# Patient Record
Sex: FEMALE | Race: WHITE | NOT HISPANIC OR LATINO | Employment: OTHER | URBAN - METROPOLITAN AREA
[De-identification: names, ages, dates, MRNs, and addresses within clinical notes are randomized per-mention and may not be internally consistent; named-entity substitution may affect disease eponyms.]

---

## 2019-08-29 ENCOUNTER — HOSPITAL ENCOUNTER (INPATIENT)
Facility: HOSPITAL | Age: 44
LOS: 1 days | Discharge: HOME | DRG: 918 | End: 2019-08-30
Attending: EMERGENCY MEDICINE | Admitting: HOSPITALIST
Payer: COMMERCIAL

## 2019-08-29 DIAGNOSIS — R00.0 TACHYCARDIA: ICD-10-CM

## 2019-08-29 DIAGNOSIS — R41.82 ALTERED MENTAL STATUS: ICD-10-CM

## 2019-08-29 DIAGNOSIS — T40.1X1A HEROIN OVERDOSE, ACCIDENTAL OR UNINTENTIONAL, INITIAL ENCOUNTER: Primary | ICD-10-CM

## 2019-08-29 DIAGNOSIS — R07.9 CHEST PAIN: ICD-10-CM

## 2019-08-29 LAB
ANION GAP SERPL CALC-SCNC: 14 MMOL/L (ref 8–16)
BUN SERPL-MCNC: 23 MG/DL (ref 6–30)
CHLORIDE SERPL-SCNC: 104 MMOL/L (ref 95–110)
CREAT SERPL-MCNC: 1.1 MG/DL (ref 0.5–1.4)
GLUCOSE SERPL-MCNC: 244 MG/DL (ref 70–110)
GLUCOSE SERPL-MCNC: 253 MG/DL (ref 70–110)
HCT VFR BLD CALC: 40 %PCV (ref 36–54)
POC IONIZED CALCIUM: 1.09 MMOL/L (ref 1.06–1.42)
POC TCO2 (MEASURED): 24 MMOL/L (ref 23–29)
POTASSIUM BLD-SCNC: 5 MMOL/L (ref 3.5–5.1)
SAMPLE: ABNORMAL
SODIUM BLD-SCNC: 136 MMOL/L (ref 136–145)

## 2019-08-29 PROCEDURE — 96376 TX/PRO/DX INJ SAME DRUG ADON: CPT

## 2019-08-29 PROCEDURE — 81025 URINE PREGNANCY TEST: CPT | Performed by: STUDENT IN AN ORGANIZED HEALTH CARE EDUCATION/TRAINING PROGRAM

## 2019-08-29 PROCEDURE — 96375 TX/PRO/DX INJ NEW DRUG ADDON: CPT

## 2019-08-29 PROCEDURE — 63600175 PHARM REV CODE 636 W HCPCS: Performed by: STUDENT IN AN ORGANIZED HEALTH CARE EDUCATION/TRAINING PROGRAM

## 2019-08-29 PROCEDURE — 84484 ASSAY OF TROPONIN QUANT: CPT

## 2019-08-29 PROCEDURE — 99285 EMERGENCY DEPT VISIT HI MDM: CPT | Mod: 25

## 2019-08-29 PROCEDURE — 96374 THER/PROPH/DIAG INJ IV PUSH: CPT

## 2019-08-29 PROCEDURE — 94770 HC EXHALED C02 TEST: CPT

## 2019-08-29 PROCEDURE — 80053 COMPREHEN METABOLIC PANEL: CPT

## 2019-08-29 PROCEDURE — 83880 ASSAY OF NATRIURETIC PEPTIDE: CPT

## 2019-08-29 PROCEDURE — 99900035 HC TECH TIME PER 15 MIN (STAT)

## 2019-08-29 PROCEDURE — 85025 COMPLETE CBC W/AUTO DIFF WBC: CPT

## 2019-08-29 RX ORDER — NALOXONE HCL 0.4 MG/ML
0.4 VIAL (ML) INJECTION
Status: COMPLETED | OUTPATIENT
Start: 2019-08-29 | End: 2019-08-29

## 2019-08-29 RX ADMIN — NALOXONE HYDROCHLORIDE 0.4 MG: 0.4 INJECTION, SOLUTION INTRAMUSCULAR; INTRAVENOUS; SUBCUTANEOUS at 11:08

## 2019-08-30 VITALS
TEMPERATURE: 98 F | DIASTOLIC BLOOD PRESSURE: 57 MMHG | RESPIRATION RATE: 7 BRPM | HEART RATE: 71 BPM | WEIGHT: 146.19 LBS | SYSTOLIC BLOOD PRESSURE: 111 MMHG | BODY MASS INDEX: 25.9 KG/M2 | HEIGHT: 63 IN | OXYGEN SATURATION: 91 %

## 2019-08-30 PROBLEM — R06.89 RESPIRATORY DEPRESSION: Status: ACTIVE | Noted: 2019-08-30

## 2019-08-30 PROBLEM — F19.10 POLYSUBSTANCE ABUSE: Status: ACTIVE | Noted: 2019-08-30

## 2019-08-30 PROBLEM — T40.1X1A HEROIN OVERDOSE, ACCIDENTAL OR UNINTENTIONAL, INITIAL ENCOUNTER: Status: ACTIVE | Noted: 2019-08-30

## 2019-08-30 LAB
ALBUMIN SERPL BCP-MCNC: 4.1 G/DL (ref 3.5–5.2)
ALP SERPL-CCNC: 67 U/L (ref 55–135)
ALT SERPL W/O P-5'-P-CCNC: 67 U/L (ref 10–44)
AMPHET+METHAMPHET UR QL: NEGATIVE
ANION GAP SERPL CALC-SCNC: 9 MMOL/L (ref 8–16)
AST SERPL-CCNC: 72 U/L (ref 10–40)
B-HCG UR QL: NEGATIVE
BACTERIA #/AREA URNS HPF: ABNORMAL /HPF
BARBITURATES UR QL SCN>200 NG/ML: NORMAL
BASOPHILS # BLD AUTO: 0.07 K/UL (ref 0–0.2)
BASOPHILS NFR BLD: 0.5 % (ref 0–1.9)
BENZODIAZ UR QL SCN>200 NG/ML: NEGATIVE
BILIRUB SERPL-MCNC: 0.6 MG/DL (ref 0.1–1)
BILIRUB UR QL STRIP: NEGATIVE
BNP SERPL-MCNC: 10 PG/ML (ref 0–99)
BUN SERPL-MCNC: 23 MG/DL (ref 6–20)
BZE UR QL SCN: NORMAL
CALCIUM SERPL-MCNC: 8 MG/DL (ref 8.7–10.5)
CANNABINOIDS UR QL SCN: NEGATIVE
CHLORIDE SERPL-SCNC: 102 MMOL/L (ref 95–110)
CK SERPL-CCNC: 411 U/L (ref 20–180)
CLARITY UR: CLEAR
CO2 SERPL-SCNC: 23 MMOL/L (ref 23–29)
COLOR UR: YELLOW
CREAT SERPL-MCNC: 1.1 MG/DL (ref 0.5–1.4)
CREAT UR-MCNC: 81 MG/DL (ref 15–325)
CTP QC/QA: YES
DIFFERENTIAL METHOD: ABNORMAL
EOSINOPHIL # BLD AUTO: 0.1 K/UL (ref 0–0.5)
EOSINOPHIL NFR BLD: 0.5 % (ref 0–8)
ERYTHROCYTE [DISTWIDTH] IN BLOOD BY AUTOMATED COUNT: 14.7 % (ref 11.5–14.5)
EST. GFR  (AFRICAN AMERICAN): >60 ML/MIN/1.73 M^2
EST. GFR  (NON AFRICAN AMERICAN): >60 ML/MIN/1.73 M^2
GLUCOSE SERPL-MCNC: 251 MG/DL (ref 70–110)
GLUCOSE UR QL STRIP: ABNORMAL
HCT VFR BLD AUTO: 39.3 % (ref 37–48.5)
HGB BLD-MCNC: 12.9 G/DL (ref 12–16)
HGB UR QL STRIP: NEGATIVE
HYALINE CASTS #/AREA URNS LPF: 10 /LPF
IMM GRANULOCYTES # BLD AUTO: 0.23 K/UL (ref 0–0.04)
IMM GRANULOCYTES NFR BLD AUTO: 1.6 % (ref 0–0.5)
KETONES UR QL STRIP: NEGATIVE
LEUKOCYTE ESTERASE UR QL STRIP: NEGATIVE
LYMPHOCYTES # BLD AUTO: 2.1 K/UL (ref 1–4.8)
LYMPHOCYTES NFR BLD: 14.4 % (ref 18–48)
MCH RBC QN AUTO: 29.9 PG (ref 27–31)
MCHC RBC AUTO-ENTMCNC: 32.8 G/DL (ref 32–36)
MCV RBC AUTO: 91 FL (ref 82–98)
MICROSCOPIC COMMENT: ABNORMAL
MONOCYTES # BLD AUTO: 0.8 K/UL (ref 0.3–1)
MONOCYTES NFR BLD: 5.5 % (ref 4–15)
NEUTROPHILS # BLD AUTO: 11.3 K/UL (ref 1.8–7.7)
NEUTROPHILS NFR BLD: 77.5 % (ref 38–73)
NITRITE UR QL STRIP: POSITIVE
NRBC BLD-RTO: 0 /100 WBC
OPIATES UR QL SCN: NORMAL
PCP UR QL SCN>25 NG/ML: NEGATIVE
PH UR STRIP: 6 [PH] (ref 5–8)
PLATELET # BLD AUTO: 275 K/UL (ref 150–350)
PMV BLD AUTO: 9.9 FL (ref 9.2–12.9)
POTASSIUM SERPL-SCNC: 4.9 MMOL/L (ref 3.5–5.1)
PROT SERPL-MCNC: 7.2 G/DL (ref 6–8.4)
PROT UR QL STRIP: ABNORMAL
RBC # BLD AUTO: 4.31 M/UL (ref 4–5.4)
RBC #/AREA URNS HPF: 0 /HPF (ref 0–4)
SODIUM SERPL-SCNC: 134 MMOL/L (ref 136–145)
SP GR UR STRIP: 1.01 (ref 1–1.03)
SQUAMOUS #/AREA URNS HPF: 3 /HPF
TOXICOLOGY INFORMATION: NORMAL
TROPONIN I SERPL DL<=0.01 NG/ML-MCNC: <0.03 NG/ML (ref 0.02–0.04)
TROPONIN I SERPL DL<=0.01 NG/ML-MCNC: <0.03 NG/ML (ref 0.02–0.04)
URN SPEC COLLECT METH UR: ABNORMAL
UROBILINOGEN UR STRIP-ACNC: NEGATIVE EU/DL
WBC # BLD AUTO: 14.62 K/UL (ref 3.9–12.7)
WBC #/AREA URNS HPF: 2 /HPF (ref 0–5)
YEAST URNS QL MICRO: ABNORMAL

## 2019-08-30 PROCEDURE — 84484 ASSAY OF TROPONIN QUANT: CPT

## 2019-08-30 PROCEDURE — 20000000 HC ICU ROOM

## 2019-08-30 PROCEDURE — 81001 URINALYSIS AUTO W/SCOPE: CPT

## 2019-08-30 PROCEDURE — 93005 ELECTROCARDIOGRAM TRACING: CPT

## 2019-08-30 PROCEDURE — 82550 ASSAY OF CK (CPK): CPT

## 2019-08-30 PROCEDURE — 63600175 PHARM REV CODE 636 W HCPCS: Performed by: HOSPITALIST

## 2019-08-30 PROCEDURE — 25000003 PHARM REV CODE 250: Performed by: HOSPITALIST

## 2019-08-30 PROCEDURE — 80307 DRUG TEST PRSMV CHEM ANLYZR: CPT

## 2019-08-30 PROCEDURE — 87040 BLOOD CULTURE FOR BACTERIA: CPT

## 2019-08-30 PROCEDURE — 36415 COLL VENOUS BLD VENIPUNCTURE: CPT

## 2019-08-30 PROCEDURE — 63600175 PHARM REV CODE 636 W HCPCS: Performed by: EMERGENCY MEDICINE

## 2019-08-30 PROCEDURE — 63600175 PHARM REV CODE 636 W HCPCS: Performed by: STUDENT IN AN ORGANIZED HEALTH CARE EDUCATION/TRAINING PROGRAM

## 2019-08-30 RX ORDER — MUPIROCIN 20 MG/G
OINTMENT TOPICAL 2 TIMES DAILY
Status: DISCONTINUED | OUTPATIENT
Start: 2019-08-30 | End: 2019-08-30 | Stop reason: HOSPADM

## 2019-08-30 RX ORDER — IBUPROFEN 200 MG
24 TABLET ORAL
Status: DISCONTINUED | OUTPATIENT
Start: 2019-08-30 | End: 2019-08-30 | Stop reason: HOSPADM

## 2019-08-30 RX ORDER — GLUCAGON 1 MG
1 KIT INJECTION
Status: DISCONTINUED | OUTPATIENT
Start: 2019-08-30 | End: 2019-08-30 | Stop reason: HOSPADM

## 2019-08-30 RX ORDER — CHLORHEXIDINE GLUCONATE ORAL RINSE 1.2 MG/ML
15 SOLUTION DENTAL 2 TIMES DAILY
Status: DISCONTINUED | OUTPATIENT
Start: 2019-08-30 | End: 2019-08-30 | Stop reason: HOSPADM

## 2019-08-30 RX ORDER — NALOXONE HCL 0.4 MG/ML
0.4 VIAL (ML) INJECTION
Status: COMPLETED | OUTPATIENT
Start: 2019-08-30 | End: 2019-08-30

## 2019-08-30 RX ORDER — ENOXAPARIN SODIUM 100 MG/ML
40 INJECTION SUBCUTANEOUS EVERY 24 HOURS
Status: DISCONTINUED | OUTPATIENT
Start: 2019-08-30 | End: 2019-08-30 | Stop reason: HOSPADM

## 2019-08-30 RX ORDER — CEFTRIAXONE 1 G/1
1 INJECTION, POWDER, FOR SOLUTION INTRAMUSCULAR; INTRAVENOUS
Status: COMPLETED | OUTPATIENT
Start: 2019-08-30 | End: 2019-08-30

## 2019-08-30 RX ORDER — IBUPROFEN 200 MG
16 TABLET ORAL
Status: DISCONTINUED | OUTPATIENT
Start: 2019-08-30 | End: 2019-08-30 | Stop reason: HOSPADM

## 2019-08-30 RX ORDER — POLYETHYLENE GLYCOL 3350 17 G/17G
17 POWDER, FOR SOLUTION ORAL DAILY
Status: DISCONTINUED | OUTPATIENT
Start: 2019-08-30 | End: 2019-08-30 | Stop reason: HOSPADM

## 2019-08-30 RX ORDER — SODIUM CHLORIDE 0.9 % (FLUSH) 0.9 %
10 SYRINGE (ML) INJECTION
Status: DISCONTINUED | OUTPATIENT
Start: 2019-08-30 | End: 2019-08-30 | Stop reason: HOSPADM

## 2019-08-30 RX ORDER — IPRATROPIUM BROMIDE AND ALBUTEROL SULFATE 2.5; .5 MG/3ML; MG/3ML
3 SOLUTION RESPIRATORY (INHALATION) EVERY 8 HOURS
Status: DISCONTINUED | OUTPATIENT
Start: 2019-08-30 | End: 2019-08-30

## 2019-08-30 RX ORDER — SODIUM CHLORIDE 9 MG/ML
INJECTION, SOLUTION INTRAVENOUS CONTINUOUS
Status: DISCONTINUED | OUTPATIENT
Start: 2019-08-30 | End: 2019-08-30 | Stop reason: HOSPADM

## 2019-08-30 RX ORDER — IPRATROPIUM BROMIDE AND ALBUTEROL SULFATE 2.5; .5 MG/3ML; MG/3ML
3 SOLUTION RESPIRATORY (INHALATION) EVERY 6 HOURS PRN
Status: DISCONTINUED | OUTPATIENT
Start: 2019-08-30 | End: 2019-08-30 | Stop reason: HOSPADM

## 2019-08-30 RX ADMIN — CEFTRIAXONE SODIUM 1 G: 1 INJECTION, POWDER, FOR SOLUTION INTRAMUSCULAR; INTRAVENOUS at 03:08

## 2019-08-30 RX ADMIN — NALOXONE HYDROCHLORIDE 0.4 MG/HR: 0.4 INJECTION, SOLUTION INTRAMUSCULAR; INTRAVENOUS; SUBCUTANEOUS at 08:08

## 2019-08-30 RX ADMIN — NALOXONE HYDROCHLORIDE 0.4 MG: 0.4 INJECTION, SOLUTION INTRAMUSCULAR; INTRAVENOUS; SUBCUTANEOUS at 03:08

## 2019-08-30 RX ADMIN — NALOXONE HYDROCHLORIDE 0.4 MG: 0.4 INJECTION, SOLUTION INTRAMUSCULAR; INTRAVENOUS; SUBCUTANEOUS at 02:08

## 2019-08-30 RX ADMIN — SODIUM CHLORIDE 1000 ML: 9 INJECTION, SOLUTION INTRAVENOUS at 04:08

## 2019-08-30 RX ADMIN — NALOXONE HYDROCHLORIDE 0.4 MG/HR: 0.4 INJECTION, SOLUTION INTRAMUSCULAR; INTRAVENOUS; SUBCUTANEOUS at 04:08

## 2019-08-30 RX ADMIN — FOLIC ACID: 5 INJECTION, SOLUTION INTRAMUSCULAR; INTRAVENOUS; SUBCUTANEOUS at 07:08

## 2019-08-30 RX ADMIN — SODIUM CHLORIDE: 0.9 INJECTION, SOLUTION INTRAVENOUS at 06:08

## 2019-08-30 NOTE — ED NOTES
Patient identifiers for Adeola Cameron checked and correct.  LOC:  Adeola Cameron is drowsy and hard to arouse at this time.   SKIN:  The skin is warm and dry. She has normal skin turgor and moist mucus membranes. Skin is intact; bruising noted to the bilateral arms.  MUSCULOSKELETAL:  Pulses intact.   RESPIRATORY:  Airway is open and patent. Respirations are spontaneous but at a decreased rate.  CARDIAC:  She has a normal rate and rhythm. No peripheral edema noted. Capillary refill < 3 seconds.  ABDOMEN:  No distention noted.  Soft and non-tender upon palpation.  NEUROLOGICAL: Pupils pin point.  Allergies reported:    Review of patient's allergies indicates:   Allergen Reactions    Sulfa (sulfonamide antibiotics) Anaphylaxis     OTHER NOTES:

## 2019-08-30 NOTE — HPI
Patient is extremely drowsy most of the history was obtained from ER physician.     43-year-old lady without any significant past medical history was brought in by her uncle due to drug overdose.  Upon my assessment patient wakes up however falls back to sleep and is not giving any meaningful history except admitting that she took too much heroin.  Patient is easily arousable however she goes back to sleep and her respiratory rate goes into single digit in spite getting 5 doses of Narcan so far.  Otherwise she denies any headache, dizziness, chest pain, palpitation, nausea, vomiting, bladder or bowel symptoms.     Past medical history:  Polysubstance abuse, chronic back pain  Family history:  Unable to review  Social history:  Admits taking heroin once in a while, more than 20 pack year smoking history, denies any other recreational drug use  Surgical history:  Unable to obtain

## 2019-08-30 NOTE — ASSESSMENT & PLAN NOTE
Due to heroin overdose  Started on Narcan drip  If worsens we might have to obtain ABG  Supplemental O2

## 2019-08-30 NOTE — ASSESSMENT & PLAN NOTE
"-admits injecting "too much heroin"  -still very drowsy and becomes apneic in spite of receiving 5 doses of Narcan  -she is easily arousable  -started Narcan drip  -check CPK  -fall precautions, seizure precautions  "

## 2019-08-30 NOTE — H&P
[facility] Medicine  History & Physical    Patient Name: Adeola Cameron  MRN: [id number]  Admission Date: 2019  Attending Physician: Marizol Coleman MD   Primary Care Provider: Wayne Osborn MD         Patient information was obtained from ER records.     Subjective:     Principal Problem:Heroin overdose, accidental or unintentional, initial encounter    Chief Complaint:   Chief Complaint   Patient presents with    Drug Overdose     found by father at home        HPI: Patient is extremely drowsy most of the history was obtained from ER physician.     43-year-old lady without any significant past medical history was brought in by her uncle due to drug overdose.  Upon my assessment patient wakes up however falls back to sleep and is not giving any meaningful history except admitting that she took too much heroin.  Patient is easily arousable however she goes back to sleep and her respiratory rate goes into single digit in spite getting 5 doses of Narcan so far.  Otherwise she denies any headache, dizziness, chest pain, palpitation, nausea, vomiting, bladder or bowel symptoms.     Past medical history:  Polysubstance abuse, chronic back pain  Family history:  Unable to review  Social history:  Admits taking heroin once in a while, more than 20 pack year smoking history, denies any other recreational drug use  Surgical history:  Unable to obtain    Past Medical History:   Diagnosis Date    Bipolar affective disorder     Chronic foot pain        Past Surgical History:   Procedure Laterality Date    cervical nerve release       SECTION, CLASSIC      x2    FOOT SURGERY      FRACTURE SURGERY      TUBAL LIGATION         Review of patient's allergies indicates:   Allergen Reactions    Sulfa (sulfonamide antibiotics) Anaphylaxis       No current facility-administered medications on file prior to encounter.      Current Outpatient Medications on File Prior to Encounter    Medication Sig    cloNIDine (CATAPRES) 0.1 MG tablet Take 1 tablet (0.1 mg total) by mouth 2 (two) times daily.    gabapentin (NEURONTIN) 400 MG capsule Take 400 mg by mouth every evening.    gabapentin (NEURONTIN) 600 MG tablet Take 800 mg by mouth 3 (three) times daily.     lidocaine HCL 2% (XYLOCAINE) 2 % jelly     methocarbamol (ROBAXIN) 500 MG Tab Take 500 mg by mouth 4 (four) times daily.    naproxen (NAPROSYN) 500 MG tablet Take 1 tablet (500 mg total) by mouth 2 (two) times daily as needed.    ondansetron (ZOFRAN) 4 MG tablet Take 1 tablet (4 mg total) by mouth every 8 (eight) hours as needed for Nausea.    oxycodone-acetaminophen (PERCOCET)  mg per tablet Take 1 tablet by mouth 4 (four) times daily as needed.    tizanidine (ZANAFLEX) 4 MG tablet     trazodone (DESYREL) 50 MG tablet Take 50 mg by mouth every evening.    valacyclovir (VALTREX) 1000 MG tablet Take 1 tablet (1,000 mg total) by mouth every 12 (twelve) hours.     Family History     None        Tobacco Use    Smoking status: Current Every Day Smoker     Packs/day: 1.00     Types: Cigarettes   Substance and Sexual Activity    Alcohol use: No    Drug use: Yes     Types: Marijuana, IV     Comment: Heroin, crack    Sexual activity: Not on file     Review of Systems   Constitutional: Negative for chills and fever.   HENT: Negative for dental problem and sore throat.    Eyes: Negative for redness and itching.   Respiratory: Positive for apnea. Negative for chest tightness and shortness of breath.    Cardiovascular: Negative for chest pain, palpitations and leg swelling.   Gastrointestinal: Negative for blood in stool, constipation, diarrhea and nausea.   Genitourinary: Negative for dysuria.   Musculoskeletal: Positive for back pain and myalgias. Negative for gait problem and joint swelling.   Skin: Positive for rash.   Neurological: Positive for dizziness. Negative for tremors and weakness.        Somnolent    Psychiatric/Behavioral: Positive for confusion. Negative for behavioral problems and sleep disturbance.   All other systems reviewed and are negative.    Objective:     Vital Signs (Most Recent):  Temp: 97.9 °F (36.6 °C) (08/29/19 2338)  Pulse: 88 (08/30/19 0130)  Resp: (!) 9 (08/30/19 0100)  BP: 125/69 (08/30/19 0130)  SpO2: 95 % (08/30/19 0130) Vital Signs (24h Range):  Temp:  [97.9 °F (36.6 °C)] 97.9 °F (36.6 °C)  Pulse:  [82-88] 88  Resp:  [9-14] 9  SpO2:  [50 %-100 %] 95 %  BP: (108-125)/(65-76) 125/69     Weight: 64.7 kg (142 lb 10.2 oz)  Body mass index is 21.69 kg/m².    Physical Exam   Constitutional: She appears well-developed and well-nourished.   Very drowsy   HENT:   Head: Normocephalic and atraumatic.   Mouth/Throat: Oropharynx is clear and moist.   Eyes: Pupils are equal, round, and reactive to light. EOM are normal.   Neck: Normal range of motion. Neck supple. No JVD present.   Cardiovascular: Normal rate, regular rhythm and normal heart sounds. Exam reveals no gallop.   No murmur heard.  Pulmonary/Chest: Breath sounds normal. No respiratory distress. She has no wheezes.   Bradypnea   Abdominal: Soft. Bowel sounds are normal. She exhibits no distension. There is no rebound and no guarding.   Musculoskeletal: She exhibits no edema.   Lymphadenopathy:     She has no cervical adenopathy.   Neurological: No cranial nerve deficit.   Very drowsy however upon waking up she is oriented x3   Skin: Skin is warm and dry. Capillary refill takes less than 2 seconds. No rash noted.   IV drug use marks in both upper extremities   Psychiatric: Her behavior is normal.   Non suicidal   Nursing note and vitals reviewed.        CRANIAL NERVES     CN III, IV, VI   Pupils are equal, round, and reactive to light.  Extraocular motions are normal.        Significant Labs: All pertinent labs within the past 24 hours have been reviewed.    Significant Imaging: I have reviewed all pertinent imaging results/findings within the  "past 24 hours.    Assessment/Plan:     * Heroin overdose, accidental or unintentional, initial encounter  -admits injecting "too much heroin"  -still very drowsy and becomes apneic in spite of receiving 5 doses of Narcan  -she is easily arousable  -started Narcan drip  -check CPK  -fall precautions, seizure precautions    Polysubstance abuse  Admit to using heroin, UDS is positive for cocaine also  Aggressive electrolyte replacement, IV hydration  Blood cultures, urine cultures  Banana bag  Social work consult for substance abuse rehab resources    Respiratory depression    Due to heroin overdose  Started on Narcan drip  If worsens we might have to obtain ABG  Supplemental O2      VTE Risk Mitigation (From admission, onward)    None             Adan Arias MD  Department of Hospital Medicine   [facility]  "

## 2019-08-30 NOTE — PHYSICIAN QUERY
PT Name: Adeola Cameron  MR #: [id number]     Physician Query Form - Documentation Clarification      CDS/: Rani Batista               Contact information:[phone] or visit HIM    This form is a permanent document in the medical record.     Query Date: August 30, 2019    By submitting this query, we are merely seeking further clarification of documentation. Please utilize your independent clinical judgment when addressing the question(s) below.    The Medical record reflects the following:  Pt admitted for a herion overdose.    Supporting Clinical Findings Location in Medical Record   Patient is extremely drowsy     Patient is easily arousable however she goes back to sleep,in spite getting 5 doses of Narcan so far.     Positive for confusion             H/P                                                                            If you feel that the above findings are significant, is there an associated diagnosis that can be documented to better reflect the severity of the pt's neurological status this admission? Please include the type if applicable.    Provider Use Only      The patient was recovering from an unintentional overdose of heroin                                                                                                                           [  ] Clinically Undetermined

## 2019-08-30 NOTE — PLAN OF CARE
08/30/19 1536   Discharge Assessment   Assessment Type Discharge Planning Reassessment   Pt discharged to home with no needs. Cm agrees with discharge plans.

## 2019-08-30 NOTE — DISCHARGE SUMMARY
"[facility] Medicine  Discharge Summary      Patient Name: Adeola Cameron  MRN: [id number]  Admission Date: 8/29/2019  Hospital Length of Stay: 0 days  Discharge Date and Time:  08/30/2019 3:07 PM  Attending Physician: Henrik Bonilla MD   Discharging Provider: Henrik Bonilla MD  Primary Care Provider: Wayne Osborn MD      HPI:   Patient is extremely drowsy most of the history was obtained from ER physician.     43-year-old lady without any significant past medical history was brought in by her uncle due to drug overdose.  Upon my assessment patient wakes up however falls back to sleep and is not giving any meaningful history except admitting that she took too much heroin.  Patient is easily arousable however she goes back to sleep and her respiratory rate goes into single digit in spite getting 5 doses of Narcan so far.  Otherwise she denies any headache, dizziness, chest pain, palpitation, nausea, vomiting, bladder or bowel symptoms.     Past medical history:  Polysubstance abuse, chronic back pain  Family history:  Unable to review  Social history:  Admits taking heroin once in a while, more than 20 pack year smoking history, denies any other recreational drug use  Surgical history:  Unable to obtain    * No surgery found *      Hospital Course:   8/30 Admitted to MICU last PM after unintentional heroin overdose. Was placed on naloxone infusion, and woke. Infusion was discontinued 1100 hours this AM. The patient has been awake, alert and oriented without any issue since. She state she feels well and would like to be discharged home. Discussed inevitable poor outcome with heroin. The patient states, "I know". She states she will seek outside addiction counseling and try to get on Saboxone.   VSS  Lungs:Clear  Heart: S1S2 reg  Abdo: sft     Consults:     No new Assessment & Plan notes have been filed under this hospital service since the last note was generated.  Service: Hospital " Medicine    Final Active Diagnoses:    Diagnosis Date Noted POA    PRINCIPAL PROBLEM:  Heroin overdose, accidental or unintentional, initial encounter [T40.1X1A] 08/30/2019 Yes    Respiratory depression [R06.89] 08/30/2019 Yes    Polysubstance abuse [F19.10] 08/30/2019 Unknown      Problems Resolved During this Admission:       Discharged Condition: good    Disposition: Home or Self Care    Follow Up:    Patient Instructions:      Diet Cardiac     Activity as tolerated       Significant Diagnostic Studies: Labs:   BMP:   Recent Labs   Lab 08/29/19  2356   *   *   K 4.9      CO2 23   BUN 23*   CREATININE 1.1   CALCIUM 8.0*    and CBC   Recent Labs   Lab 08/29/19  2356   WBC 14.62*   HGB 12.9   HCT 39.3          Pending Diagnostic Studies:     Procedure Component Value Units Date/Time    EKG 12-lead [[id number]]     Order Status:  Sent Lab Status:  No result     EKG 12-lead [[id number]]     Order Status:  Sent Lab Status:  No result     Urinalysis, Reflex to Urine Culture Urine, Clean Catch [[id number]] Collected:  08/30/19 0642    Order Status:  Sent Lab Status:  In process Updated:  08/30/19 0644    Specimen:  Urine          Medications:  Reconciled Home Medications:      Medication List      CONTINUE taking these medications    cloNIDine 0.1 MG tablet  Commonly known as:  CATAPRES  Take 1 tablet (0.1 mg total) by mouth 2 (two) times daily.     * gabapentin 600 MG tablet  Commonly known as:  NEURONTIN  Take 800 mg by mouth 3 (three) times daily.     * gabapentin 400 MG capsule  Commonly known as:  NEURONTIN  Take 400 mg by mouth every evening.     traZODone 50 MG tablet  Commonly known as:  DESYREL  Take 50 mg by mouth every evening.     valACYclovir 1000 MG tablet  Commonly known as:  VALTREX  Take 1 tablet (1,000 mg total) by mouth every 12 (twelve) hours.         * This list has 2 medication(s) that are the same as other medications prescribed for you. Read the directions carefully, and  ask your doctor or other care provider to review them with you.            STOP taking these medications    lidocaine HCL 2% 2 % jelly  Commonly known as:  XYLOCAINE     methocarbamol 500 MG Tab  Commonly known as:  ROBAXIN     naproxen 500 MG tablet  Commonly known as:  NAPROSYN     ondansetron 4 MG tablet  Commonly known as:  ZOFRAN     oxyCODONE-acetaminophen  mg per tablet  Commonly known as:  PERCOCET     tiZANidine 4 MG tablet  Commonly known as:  ZANAFLEX            Indwelling Lines/Drains at time of discharge:   Lines/Drains/Airways          None          Time spent on the discharge of patient: 32 minutes  Patient was seen and examined on the date of discharge and determined to be suitable for discharge.         Henrik Bonilla MD  Department of Hospital Medicine  [facility]

## 2019-08-30 NOTE — HOSPITAL COURSE
"8/30 Admitted to MICU last PM after unintentional heroin overdose. Was placed on naloxone infusion, and woke. Infusion was discontinued 1100 hours this AM. The patient has been awake, alert and oriented without any issue since. She state she feels well and would like to be discharged home. Discussed inevitable poor outcome with heroin. The patient states, "I know". She states she will seek outside addiction counseling and try to get on Saboxone.   VSS  Lungs:Clear  Heart: S1S2 reg  Abdo: sft  "

## 2019-08-30 NOTE — ASSESSMENT & PLAN NOTE
Admit to using heroin, UDS is positive for cocaine also  Aggressive electrolyte replacement, IV hydration  Blood cultures, urine cultures  Banana bag  Social work consult for substance abuse rehab resources

## 2019-08-30 NOTE — SUBJECTIVE & OBJECTIVE
Past Medical History:   Diagnosis Date    Bipolar affective disorder     Chronic foot pain        Past Surgical History:   Procedure Laterality Date    cervical nerve release       SECTION, CLASSIC      x2    FOOT SURGERY      FRACTURE SURGERY      TUBAL LIGATION         Review of patient's allergies indicates:   Allergen Reactions    Sulfa (sulfonamide antibiotics) Anaphylaxis       No current facility-administered medications on file prior to encounter.      Current Outpatient Medications on File Prior to Encounter   Medication Sig    cloNIDine (CATAPRES) 0.1 MG tablet Take 1 tablet (0.1 mg total) by mouth 2 (two) times daily.    gabapentin (NEURONTIN) 400 MG capsule Take 400 mg by mouth every evening.    gabapentin (NEURONTIN) 600 MG tablet Take 800 mg by mouth 3 (three) times daily.     lidocaine HCL 2% (XYLOCAINE) 2 % jelly     methocarbamol (ROBAXIN) 500 MG Tab Take 500 mg by mouth 4 (four) times daily.    naproxen (NAPROSYN) 500 MG tablet Take 1 tablet (500 mg total) by mouth 2 (two) times daily as needed.    ondansetron (ZOFRAN) 4 MG tablet Take 1 tablet (4 mg total) by mouth every 8 (eight) hours as needed for Nausea.    oxycodone-acetaminophen (PERCOCET)  mg per tablet Take 1 tablet by mouth 4 (four) times daily as needed.    tizanidine (ZANAFLEX) 4 MG tablet     trazodone (DESYREL) 50 MG tablet Take 50 mg by mouth every evening.    valacyclovir (VALTREX) 1000 MG tablet Take 1 tablet (1,000 mg total) by mouth every 12 (twelve) hours.     Family History     None        Tobacco Use    Smoking status: Current Every Day Smoker     Packs/day: 1.00     Types: Cigarettes   Substance and Sexual Activity    Alcohol use: No    Drug use: Yes     Types: Marijuana, IV     Comment: Heroin, crack    Sexual activity: Not on file     Review of Systems   Constitutional: Negative for chills and fever.   HENT: Negative for dental problem and sore throat.    Eyes: Negative for redness and  itching.   Respiratory: Positive for apnea. Negative for chest tightness and shortness of breath.    Cardiovascular: Negative for chest pain, palpitations and leg swelling.   Gastrointestinal: Negative for blood in stool, constipation, diarrhea and nausea.   Genitourinary: Negative for dysuria.   Musculoskeletal: Positive for back pain and myalgias. Negative for gait problem and joint swelling.   Skin: Positive for rash.   Neurological: Positive for dizziness. Negative for tremors and weakness.        Somnolent   Psychiatric/Behavioral: Positive for confusion. Negative for behavioral problems and sleep disturbance.   All other systems reviewed and are negative.    Objective:     Vital Signs (Most Recent):  Temp: 97.9 °F (36.6 °C) (08/29/19 2338)  Pulse: 88 (08/30/19 0130)  Resp: (!) 9 (08/30/19 0100)  BP: 125/69 (08/30/19 0130)  SpO2: 95 % (08/30/19 0130) Vital Signs (24h Range):  Temp:  [97.9 °F (36.6 °C)] 97.9 °F (36.6 °C)  Pulse:  [82-88] 88  Resp:  [9-14] 9  SpO2:  [50 %-100 %] 95 %  BP: (108-125)/(65-76) 125/69     Weight: 64.7 kg (142 lb 10.2 oz)  Body mass index is 21.69 kg/m².    Physical Exam   Constitutional: She appears well-developed and well-nourished.   Very drowsy   HENT:   Head: Normocephalic and atraumatic.   Mouth/Throat: Oropharynx is clear and moist.   Eyes: Pupils are equal, round, and reactive to light. EOM are normal.   Neck: Normal range of motion. Neck supple. No JVD present.   Cardiovascular: Normal rate, regular rhythm and normal heart sounds. Exam reveals no gallop.   No murmur heard.  Pulmonary/Chest: Breath sounds normal. No respiratory distress. She has no wheezes.   Bradypnea   Abdominal: Soft. Bowel sounds are normal. She exhibits no distension. There is no rebound and no guarding.   Musculoskeletal: She exhibits no edema.   Lymphadenopathy:     She has no cervical adenopathy.   Neurological: No cranial nerve deficit.   Very drowsy however upon waking up she is oriented x3   Skin:  Skin is warm and dry. Capillary refill takes less than 2 seconds. No rash noted.   IV drug use marks in both upper extremities   Psychiatric: Her behavior is normal.   Non suicidal   Nursing note and vitals reviewed.        CRANIAL NERVES     CN III, IV, VI   Pupils are equal, round, and reactive to light.  Extraocular motions are normal.        Significant Labs: All pertinent labs within the past 24 hours have been reviewed.    Significant Imaging: I have reviewed all pertinent imaging results/findings within the past 24 hours.

## 2019-08-30 NOTE — ED PROVIDER NOTES
Encounter Date: 2019       History     Chief Complaint   Patient presents with    Drug Overdose     found by father at home     HPI     Ms. Cameron is a 43 y.o. Female with history of polysubstance abuse, bipolar disorder who presents to the ED via EMS after being found down by his uncle at home at the side of the bed. Patient was found to be apneic and unresponsive with response to 2nd dose of narcan. Patient admitting to taking too large of an amount of heroin and injected into left arm and denied attempting self harm.  Patient arrives with dried vomit over her clothes.  EMS reports that she vomited after receiving the 2nd dose of Narcan.  Patient denying any pain or complaints at this time.  No CBG with EMS but elevated >250 in ED on first arrival.       Review of patient's allergies indicates:   Allergen Reactions    Sulfa (sulfonamide antibiotics) Anaphylaxis     Past Medical History:   Diagnosis Date    Bipolar affective disorder     Chronic foot pain      Past Surgical History:   Procedure Laterality Date    cervical nerve release       SECTION, CLASSIC      x2    FOOT SURGERY      FRACTURE SURGERY      TUBAL LIGATION       History reviewed. No pertinent family history.  Social History     Tobacco Use    Smoking status: Current Every Day Smoker     Packs/day: 1.00     Types: Cigarettes   Substance Use Topics    Alcohol use: No    Drug use: Yes     Types: Marijuana, IV     Comment: Heroin, crack     Review of Systems   Unable to perform ROS: Mental status change       Physical Exam     Initial Vitals   BP Pulse Resp Temp SpO2   19 2336 19 2336 19 2336 19 2338 19 2334   119/73 85 14 97.9 °F (36.6 °C) (!) 50 %      MAP       --                Physical Exam    Nursing note and vitals reviewed.  Constitutional: She appears well-developed and well-nourished.   Middle-aged female solmnolent but arousable, protecting her airway, slurred speech   HENT:   Head:  Normocephalic and atraumatic.   Right Ear: External ear normal.   Left Ear: External ear normal.   Mouth/Throat: Oropharynx is clear and moist.   No septal hematoma, no signs of external head trauma   Eyes: Conjunctivae are normal. Pupils are equal, round, and reactive to light.   3 mm bilaterally   Neck: Normal range of motion. Neck supple.   No midline cervical spinal tenderness   Cardiovascular: Normal rate, regular rhythm, normal heart sounds and intact distal pulses.   No murmur heard.  Pulmonary/Chest: Breath sounds normal. No respiratory distress. She has no wheezes. She has no rales. She exhibits no tenderness.   Bradypneic   Abdominal: Soft. Bowel sounds are normal. She exhibits no distension. There is no tenderness. There is no rebound and no guarding.   Musculoskeletal: Normal range of motion.   Moving all extremities spontaneously, no lower extremity swelling, no calf tenderness   Neurological: She is oriented to person, place, and time. She has normal strength. No sensory deficit. GCS score is 15. GCS eye subscore is 4. GCS verbal subscore is 5. GCS motor subscore is 6.   somnolent but arousable to noxious stimuli, protecting airway, moving all extremities spontaneously, after Narcan was more alert able to answer orientation questions appropriately to year, month, football team in [place] and president   Skin: Skin is warm and dry. Capillary refill takes less than 2 seconds.   Track honorio in the left AC         ED Course   Procedures  Labs Reviewed   CBC W/ AUTO DIFFERENTIAL - Abnormal; Notable for the following components:       Result Value    WBC 14.62 (*)     RDW 14.7 (*)     Immature Granulocytes 1.6 (*)     Gran # (ANC) 11.3 (*)     Immature Grans (Abs) 0.23 (*)     Gran% 77.5 (*)     Lymph% 14.4 (*)     All other components within normal limits   COMPREHENSIVE METABOLIC PANEL - Abnormal; Notable for the following components:    Sodium 134 (*)     Glucose 251 (*)     BUN, Bld 23 (*)     Calcium  8.0 (*)     AST 72 (*)     ALT 67 (*)     All other components within normal limits   URINALYSIS - Abnormal; Notable for the following components:    Protein, UA Trace (*)     Glucose, UA 4+ (*)     Nitrite, UA Positive (*)     All other components within normal limits   URINALYSIS MICROSCOPIC - Abnormal; Notable for the following components:    Hyaline Casts, UA 10 (*)     All other components within normal limits   CK - Abnormal; Notable for the following components:     (*)     All other components within normal limits   POCT GLUCOSE - Abnormal; Notable for the following components:    POC Glucose 244 (*)     All other components within normal limits   ISTAT PROCEDURE - Abnormal; Notable for the following components:    POC Glucose 253 (*)     All other components within normal limits   CULTURE, BLOOD   CULTURE, BLOOD   TROPONIN I   B-TYPE NATRIURETIC PEPTIDE   DRUG SCREEN PANEL, URINE EMERGENCY   DRUG SCREEN PANEL, URINE EMERGENCY   POCT URINE PREGNANCY        ECG Results          EKG 12-lead (In process)  Result time 08/30/19 04:44:09    In process by Interface, Lab In [facility] (08/30/19 04:44:09)                 Narrative:    Test Reason : R41.82,    Vent. Rate : 082 BPM     Atrial Rate : 082 BPM     P-R Int : 174 ms          QRS Dur : 090 ms      QT Int : 410 ms       P-R-T Axes : 068 -06 038 degrees     QTc Int : 479 ms    Normal sinus rhythm  Normal ECG  No previous ECGs available    Referred By: [email or web address]   SELF           Confirmed By:                   In process by Interface, Lab In [facility] (08/30/19 04:27:40)                 Narrative:    Test Reason : R41.82,    Vent. Rate : 082 BPM     Atrial Rate : 082 BPM     P-R Int : 174 ms          QRS Dur : 090 ms      QT Int : 410 ms       P-R-T Axes : 068 -06 038 degrees     QTc Int : 479 ms    Normal sinus rhythm  Normal ECG  No previous ECGs available    Referred By: [email or web address]   SELF           Confirmed By:                             Imaging Results           CT Head Without Contrast (In process)                X-Ray Chest AP Portable (In process)                  Medical Decision Making:   History:   I obtained history from: someone other than patient and EMS provider.       <> Summary of History: Uncle states that patient has had history of polysubstance abuse.  Uncle was the one to find the patient lying on the ground in between of 2 pieces of furniture and states prior to him taking a shower she was talking and acting normally.  He states he observes that she was barely breathing however did have a pulse when he checked.  Uncle called 911.  Old Medical Records: I decided to obtain old medical records.  Initial Assessment:   43-year-old female with history of polysubstance abuse, bipolar disorder and chronic tobacco use who presents to the ED with EMS after being found down with agonal respiration however did have a pulse in the setting of patient endorsing recent heroin use after patient became more responsive after 2 doses of Narcan in the field with EMS.  Patient arrives with low respiratory rate, somnolent however protecting her airway and arousable to noxious stimuli.  Patient was satting well on room air, no signs of external trauma, patient is covered in dried vomit, no signs of head trauma, clear to auscultation bilaterally, RRR, soft nontender nondistended abdomen.  Differential Diagnosis:   Heroin overdose, drug intoxication, electrolyte abnormality, UTI, sepsis, intracranial hemorrhage or mass, contusion, fracture, rhabdomyolysis, dehydration, arrhythmia, ACS/MI  Clinical Tests:   Lab Tests: Ordered and Reviewed  Radiological Study: Ordered and Reviewed  ED Management:  Given the patient was found down with uncertain circumstances will order head CT, CBC, CMP, troponin, EKG, chest x-ray, UA, UPT.  Will check POCT glucose.  Patient arrived somnolent thus given an additional dose of Narcan upon arrival with improvement in her respiratory rate as well  as alertness.  Will start patient on capnography and monitor respiratory rate with low threshold to give additional dose of Narcan if starting to decline.  If patient has required multiple doses of Narcan will consider Narcan infusion.    Case discussed with Dr. Jared Marks MD  PGY-3 [facility]                   ED Course as of Aug 30 0706   Fri Aug 30, 2019   0308 WBC(!): 14.62 [MK]   0308 Will order ceftriaxone for presumed UTI.    NITRITE UA(!): Positive [MK]   0309 Opiate Scrn, Ur: Presumptive Positive [MK]   0309 Cocaine (Metab.): Presumptive Positive [MK]   0348 Patient continues to be solmnolent with decreased respiratory rate on capnography. Patient now to receive total of 5th dose of narcan thus will order narcan infusion and IVF. Spoke with hospitalist about admission to ICU in the setting of continued altered mental status.     [MK]      ED Course User Index  [MK] Ally Marks MD     Clinical Impression:       ICD-10-CM ICD-9-CM   1. Heroin overdose, accidental or unintentional, initial encounter T40.1X1A 965.01     E850.0   2. Altered mental status R41.82 780.97   3. Chest pain R07.9 786.50   4. Tachycardia R00.0 785.0                                Ally Marks MD  Resident  08/30/19 0707

## 2019-08-30 NOTE — PLAN OF CARE
08/30/19 1104   Discharge Assessment   Assessment Type Discharge Planning Assessment   Confirmed/corrected address and phone number on facesheet? Yes   Assessment information obtained from? Patient   Communicated expected length of stay with patient/caregiver no   Prior to hospitilization cognitive status: Alert/Oriented   Prior to hospitalization functional status: Independent   Current cognitive status: Alert/Oriented   Current Functional Status: Needs Assistance   Lives With other relative(s)  (Uncle Adiel Dsouza)   Able to Return to Prior Arrangements yes   Is patient able to care for self after discharge? Yes   Who are your caregiver(s) and their phone number(s)? Adiel Dsouza [phone]   Patient's perception of discharge disposition home or selfcare   Readmission Within the Last 30 Days no previous admission in last 30 days   Patient currently being followed by outpatient case management? No   Patient currently receives any other outside agency services? No   Equipment Currently Used at Home none   Do you have any problems affording any of your prescribed medications? No   Is the patient taking medications as prescribed? yes   Does the patient have transportation home? Yes   Transportation Anticipated family or friend will provide   Does the patient receive services at the [facility]? No   Discharge Plan A Home with family   DME Needed Upon Discharge  none   Patient/Family in Agreement with Plan yes   Cm to follow until discharged from hospital.

## 2019-09-04 ENCOUNTER — HOSPITAL ENCOUNTER (EMERGENCY)
Facility: HOSPITAL | Age: 44
Discharge: HOME | End: 2019-09-04
Attending: EMERGENCY MEDICINE
Payer: COMMERCIAL

## 2019-09-04 VITALS
DIASTOLIC BLOOD PRESSURE: 67 MMHG | TEMPERATURE: 98 F | HEIGHT: 63 IN | RESPIRATION RATE: 20 BRPM | WEIGHT: 145 LBS | HEART RATE: 109 BPM | BODY MASS INDEX: 25.69 KG/M2 | SYSTOLIC BLOOD PRESSURE: 110 MMHG | OXYGEN SATURATION: 99 %

## 2019-09-04 DIAGNOSIS — L73.9 FOLLICULITIS: Primary | ICD-10-CM

## 2019-09-04 LAB
BACTERIA BLD CULT: NORMAL
BACTERIA BLD CULT: NORMAL
BARBITURATES UR QL SCN: NEGATIVE NG/ML
LABORATORY COMMENT REPORT: NORMAL

## 2019-09-04 PROCEDURE — 99284 EMERGENCY DEPT VISIT MOD MDM: CPT

## 2019-09-04 PROCEDURE — 25000003 PHARM REV CODE 250: Performed by: EMERGENCY MEDICINE

## 2019-09-04 RX ORDER — CLINDAMYCIN HYDROCHLORIDE 150 MG/1
CAPSULE ORAL
Status: DISCONTINUED
Start: 2019-09-04 | End: 2019-09-04 | Stop reason: HOSPADM

## 2019-09-04 RX ORDER — CLINDAMYCIN HYDROCHLORIDE 300 MG/1
300 CAPSULE ORAL EVERY 8 HOURS
Qty: 20 CAPSULE | Refills: 0 | Status: SHIPPED | OUTPATIENT
Start: 2019-09-04 | End: 2019-09-11

## 2019-09-04 RX ORDER — CLOTRIMAZOLE AND BETAMETHASONE DIPROPIONATE 10; .64 MG/G; MG/G
CREAM TOPICAL 2 TIMES DAILY
Qty: 15 G | Refills: 1 | Status: SHIPPED | OUTPATIENT
Start: 2019-09-04 | End: 2019-09-22

## 2019-09-04 RX ORDER — CLINDAMYCIN HYDROCHLORIDE 150 MG/1
300 CAPSULE ORAL
Status: COMPLETED | OUTPATIENT
Start: 2019-09-04 | End: 2019-09-04

## 2019-09-04 RX ADMIN — CLINDAMYCIN HYDROCHLORIDE 300 MG: 150 CAPSULE ORAL at 08:09

## 2019-09-05 NOTE — ED PROVIDER NOTES
Encounter Date: 2019    SCRIBE #1 NOTE: IMignon, am scribing for, and in the presence of, Dr. Adame.       History     Chief Complaint   Patient presents with    Rash     to groin     2019  8:36 PM     The patient is a 43 y.o. female  who presents with rash. Patient reports a constantly burning and itchy rash to the inner thigh and vaginal area which started suddenly yesterday. She denies any fevers, swelling, nausea or vomiting. Pt was recently at the [facility] on 2019 for IV drug overdose. PMHx of bipolar affective disorder. SHx of fx surgery, c section, tubal ligation, foot surgery.      The history is provided by the patient and a significant other.     Review of patient's allergies indicates:   Allergen Reactions    Sulfa (sulfonamide antibiotics) Anaphylaxis     Past Medical History:   Diagnosis Date    Bipolar affective disorder     Chronic foot pain      Past Surgical History:   Procedure Laterality Date    cervical nerve release       SECTION, CLASSIC      x2    FOOT SURGERY      FRACTURE SURGERY      TUBAL LIGATION       History reviewed. No pertinent family history.  Social History     Tobacco Use    Smoking status: Current Every Day Smoker     Packs/day: 1.00     Types: Cigarettes   Substance Use Topics    Alcohol use: No    Drug use: Yes     Types: Marijuana, IV     Comment: Heroin, crack     Review of Systems   Constitutional: Negative for appetite change, chills and fever.   HENT: Negative for congestion, rhinorrhea and sore throat.    Respiratory: Negative for cough and shortness of breath.    Cardiovascular: Negative for chest pain.   Gastrointestinal: Negative for abdominal pain, diarrhea, nausea and vomiting.   Genitourinary: Negative for dysuria.   Musculoskeletal: Negative for back pain and myalgias.   Skin: Positive for rash.   Neurological: Negative for weakness and numbness.   Hematological: Does not bruise/bleed easily.       Physical Exam     Initial  Vitals [09/04/19 2030]   BP Pulse Resp Temp SpO2   110/67 109 20 98.4 °F (36.9 °C) 99 %      MAP       --         Physical Exam    Nursing note and vitals reviewed.  Constitutional: No distress.   HENT:   Head: Normocephalic and atraumatic.   Mouth/Throat: Mucous membranes are normal.   Eyes: EOM are normal. Pupils are equal, round, and reactive to light.   Neck: Normal range of motion.   Cardiovascular: Normal rate, regular rhythm, normal heart sounds and intact distal pulses. Exam reveals no gallop and no friction rub.    No murmur heard.  Pulmonary/Chest: Breath sounds normal. She has no wheezes. She has no rhonchi. She has no rales.   Musculoskeletal: Normal range of motion. She exhibits no edema.   Neurological: She is alert and oriented to person, place, and time.   Skin: Skin is dry. Rash noted.   Confluent and erythematous papular rash of vulvar and vagina.   Psychiatric: She has a normal mood and affect.         ED Course   Procedures  Labs Reviewed - No data to display       Imaging Results    None          Medical Decision Making:   ED Management:  43-year-old female IV drug user presents with a perineal and vaginal rash described as itchy and painful.  The rash is concluded erythematous papules which most likely represents folliculitis; however, due to the distribution fungal infection cannot be excluded.  She will be treated with both antibacterials (clindamycin) an antifungal is (Lotrisone).       APC / Resident Notes:   I, Dr. El Adame III, personally performed the services described in this documentation. All medical record entries made by the scribe were at my direction and in my presence.  I have reviewed the chart and agree that the record reflects my personal performance and is accurate and complete       Scribe Attestation:   Scribe #1: I performed the above scribed service and the documentation accurately describes the services I performed. I attest to the accuracy of the note.                Clinical Impression:       ICD-10-CM ICD-9-CM   1. Folliculitis L73.9 704.8         Disposition:   Disposition: Discharged  Condition: Stable                        El Adame III, MD  09/04/19 3385

## 2019-09-22 ENCOUNTER — HOSPITAL ENCOUNTER (EMERGENCY)
Facility: HOSPITAL | Age: 44
Discharge: HOME | End: 2019-09-23
Attending: EMERGENCY MEDICINE
Payer: COMMERCIAL

## 2019-09-22 VITALS
RESPIRATION RATE: 16 BRPM | DIASTOLIC BLOOD PRESSURE: 47 MMHG | HEART RATE: 76 BPM | OXYGEN SATURATION: 98 % | SYSTOLIC BLOOD PRESSURE: 92 MMHG | WEIGHT: 135 LBS | BODY MASS INDEX: 23.92 KG/M2 | TEMPERATURE: 98 F | HEIGHT: 63 IN

## 2019-09-22 DIAGNOSIS — N12 PYELONEPHRITIS: Primary | ICD-10-CM

## 2019-09-22 LAB
ALBUMIN SERPL BCP-MCNC: 2.4 G/DL (ref 3.5–5.2)
ALP SERPL-CCNC: 125 U/L (ref 55–135)
ALT SERPL W/O P-5'-P-CCNC: 19 U/L (ref 10–44)
ANION GAP SERPL CALC-SCNC: 13 MMOL/L (ref 8–16)
AST SERPL-CCNC: 18 U/L (ref 10–40)
B-HCG UR QL: NEGATIVE
BACTERIA #/AREA URNS HPF: ABNORMAL /HPF
BASOPHILS NFR BLD: 0 % (ref 0–1.9)
BILIRUB SERPL-MCNC: 0.3 MG/DL (ref 0.1–1)
BILIRUB UR QL STRIP: ABNORMAL
BUN SERPL-MCNC: 30 MG/DL (ref 6–20)
CALCIUM SERPL-MCNC: 9.4 MG/DL (ref 8.7–10.5)
CHLORIDE SERPL-SCNC: 98 MMOL/L (ref 95–110)
CLARITY UR: ABNORMAL
CO2 SERPL-SCNC: 26 MMOL/L (ref 23–29)
COLOR UR: YELLOW
CREAT SERPL-MCNC: 1.7 MG/DL (ref 0.5–1.4)
CTP QC/QA: YES
DIFFERENTIAL METHOD: ABNORMAL
EOSINOPHIL NFR BLD: 1 % (ref 0–8)
ERYTHROCYTE [DISTWIDTH] IN BLOOD BY AUTOMATED COUNT: 14.8 % (ref 11.5–14.5)
EST. GFR  (AFRICAN AMERICAN): 42 ML/MIN/1.73 M^2
EST. GFR  (NON AFRICAN AMERICAN): 36 ML/MIN/1.73 M^2
GLUCOSE SERPL-MCNC: 179 MG/DL (ref 70–110)
GLUCOSE UR QL STRIP: NEGATIVE
HCT VFR BLD AUTO: 34.1 % (ref 37–48.5)
HGB BLD-MCNC: 11.1 G/DL (ref 12–16)
HGB UR QL STRIP: ABNORMAL
HYALINE CASTS #/AREA URNS LPF: 0 /LPF
IMM GRANULOCYTES # BLD AUTO: ABNORMAL K/UL
KETONES UR QL STRIP: NEGATIVE
LEUKOCYTE ESTERASE UR QL STRIP: ABNORMAL
LIPASE SERPL-CCNC: 5 U/L (ref 4–60)
LYMPHOCYTES NFR BLD: 5 % (ref 18–48)
MAGNESIUM SERPL-MCNC: 1.7 MG/DL (ref 1.6–2.6)
MCH RBC QN AUTO: 29.7 PG (ref 27–31)
MCHC RBC AUTO-ENTMCNC: 32.6 G/DL (ref 32–36)
MCV RBC AUTO: 91 FL (ref 82–98)
MICROSCOPIC COMMENT: ABNORMAL
MONOCYTES NFR BLD: 6 % (ref 4–15)
NEUTROPHILS NFR BLD: 88 % (ref 38–73)
NITRITE UR QL STRIP: NEGATIVE
NRBC BLD-RTO: 0 /100 WBC
PH UR STRIP: 6 [PH] (ref 5–8)
PLATELET # BLD AUTO: 260 K/UL (ref 150–350)
PLATELET BLD QL SMEAR: ABNORMAL
PMV BLD AUTO: 9.8 FL (ref 9.2–12.9)
POTASSIUM SERPL-SCNC: 3.5 MMOL/L (ref 3.5–5.1)
PROT SERPL-MCNC: 6 G/DL (ref 6–8.4)
PROT UR QL STRIP: ABNORMAL
RBC # BLD AUTO: 3.74 M/UL (ref 4–5.4)
RBC #/AREA URNS HPF: 5 /HPF (ref 0–4)
SODIUM SERPL-SCNC: 137 MMOL/L (ref 136–145)
SP GR UR STRIP: 1.02 (ref 1–1.03)
SQUAMOUS #/AREA URNS HPF: 3 /HPF
URN SPEC COLLECT METH UR: ABNORMAL
UROBILINOGEN UR STRIP-ACNC: ABNORMAL EU/DL
WBC # BLD AUTO: 21.21 K/UL (ref 3.9–12.7)
WBC #/AREA URNS HPF: 70 /HPF (ref 0–5)
WBC CLUMPS URNS QL MICRO: ABNORMAL

## 2019-09-22 PROCEDURE — 63600175 PHARM REV CODE 636 W HCPCS: Performed by: EMERGENCY MEDICINE

## 2019-09-22 PROCEDURE — 83735 ASSAY OF MAGNESIUM: CPT

## 2019-09-22 PROCEDURE — 80053 COMPREHEN METABOLIC PANEL: CPT

## 2019-09-22 PROCEDURE — 96365 THER/PROPH/DIAG IV INF INIT: CPT

## 2019-09-22 PROCEDURE — 83690 ASSAY OF LIPASE: CPT

## 2019-09-22 PROCEDURE — 99284 EMERGENCY DEPT VISIT MOD MDM: CPT | Mod: 25

## 2019-09-22 PROCEDURE — 87186 SC STD MICRODIL/AGAR DIL: CPT

## 2019-09-22 PROCEDURE — 96361 HYDRATE IV INFUSION ADD-ON: CPT

## 2019-09-22 PROCEDURE — 85027 COMPLETE CBC AUTOMATED: CPT

## 2019-09-22 PROCEDURE — 96375 TX/PRO/DX INJ NEW DRUG ADDON: CPT

## 2019-09-22 PROCEDURE — 87088 URINE BACTERIA CULTURE: CPT

## 2019-09-22 PROCEDURE — 81025 URINE PREGNANCY TEST: CPT | Performed by: EMERGENCY MEDICINE

## 2019-09-22 PROCEDURE — 87086 URINE CULTURE/COLONY COUNT: CPT

## 2019-09-22 PROCEDURE — 36415 COLL VENOUS BLD VENIPUNCTURE: CPT

## 2019-09-22 PROCEDURE — 81000 URINALYSIS NONAUTO W/SCOPE: CPT

## 2019-09-22 PROCEDURE — 87077 CULTURE AEROBIC IDENTIFY: CPT

## 2019-09-22 PROCEDURE — 85007 BL SMEAR W/DIFF WBC COUNT: CPT

## 2019-09-22 RX ORDER — KETOROLAC TROMETHAMINE 30 MG/ML
10 INJECTION, SOLUTION INTRAMUSCULAR; INTRAVENOUS
Status: COMPLETED | OUTPATIENT
Start: 2019-09-22 | End: 2019-09-22

## 2019-09-22 RX ORDER — DIPHENHYDRAMINE HYDROCHLORIDE 50 MG/ML
25 INJECTION INTRAMUSCULAR; INTRAVENOUS
Status: COMPLETED | OUTPATIENT
Start: 2019-09-22 | End: 2019-09-22

## 2019-09-22 RX ORDER — PROCHLORPERAZINE EDISYLATE 5 MG/ML
10 INJECTION INTRAMUSCULAR; INTRAVENOUS
Status: COMPLETED | OUTPATIENT
Start: 2019-09-22 | End: 2019-09-22

## 2019-09-22 RX ORDER — TIZANIDINE HYDROCHLORIDE 4 MG/1
4 CAPSULE, GELATIN COATED ORAL 4 TIMES DAILY PRN
COMMUNITY

## 2019-09-22 RX ORDER — DICLOFENAC SODIUM 25 MG/1
25 TABLET, DELAYED RELEASE ORAL 2 TIMES DAILY PRN
Qty: 20 TABLET | Refills: 0 | Status: SHIPPED | OUTPATIENT
Start: 2019-09-22

## 2019-09-22 RX ORDER — AMOXICILLIN AND CLAVULANATE POTASSIUM 875; 125 MG/1; MG/1
1 TABLET, FILM COATED ORAL 2 TIMES DAILY
Qty: 20 TABLET | Refills: 0 | Status: SHIPPED | OUTPATIENT
Start: 2019-09-22 | End: 2019-10-02

## 2019-09-22 RX ADMIN — DIPHENHYDRAMINE HYDROCHLORIDE 25 MG: 50 INJECTION INTRAMUSCULAR; INTRAVENOUS at 10:09

## 2019-09-22 RX ADMIN — CEFTRIAXONE 1 G: 1 INJECTION, SOLUTION INTRAVENOUS at 11:09

## 2019-09-22 RX ADMIN — KETOROLAC TROMETHAMINE 10 MG: 30 INJECTION, SOLUTION INTRAMUSCULAR at 10:09

## 2019-09-22 RX ADMIN — PROCHLORPERAZINE EDISYLATE 10 MG: 5 INJECTION INTRAMUSCULAR; INTRAVENOUS at 10:09

## 2019-09-22 RX ADMIN — SODIUM CHLORIDE 1000 ML: 0.9 INJECTION, SOLUTION INTRAVENOUS at 10:09

## 2019-09-22 RX ADMIN — SODIUM CHLORIDE, SODIUM LACTATE, POTASSIUM CHLORIDE, AND CALCIUM CHLORIDE 1000 ML: .6; .31; .03; .02 INJECTION, SOLUTION INTRAVENOUS at 11:09

## 2019-09-23 NOTE — ED PROVIDER NOTES
Encounter Date: 2019    SCRIBE #1 NOTE: ILiset, am scribing for, and in the presence of, Robert Benson MD.       History     Chief Complaint   Patient presents with    Headache     X 1 week with associated ABD pain and back pain       Time seen by provider: 9:54 PM on 2019    Adeola Cameron is a 43 y.o. female with who presents to the ED with an onset of a headache, abdominal and back pain for one week. She also states that she is lightheaded, coughing, and has discomfort when defecating. She has been taking Excedrin for her headaches with mild relief. The patient states that she does not normally experience headaches often. The patient denies blood in stool, blood in urine, painful urination, fever, or any other symptoms at this time. PMHx includes bi-polar and chronic foot pain. No pertinent PSHx. Patient has a high severity drug allergy to sulfa.      The history is provided by the patient.     Review of patient's allergies indicates:   Allergen Reactions    Sulfa (sulfonamide antibiotics) Anaphylaxis     Past Medical History:   Diagnosis Date    Bipolar affective disorder     Chronic foot pain      Past Surgical History:   Procedure Laterality Date    cervical nerve release       SECTION, CLASSIC      x2    FOOT SURGERY      FRACTURE SURGERY      TUBAL LIGATION       History reviewed. No pertinent family history.  Social History     Tobacco Use    Smoking status: Current Every Day Smoker     Packs/day: 1.00     Types: Cigarettes    Smokeless tobacco: Never Used   Substance Use Topics    Alcohol use: No    Drug use: Yes     Types: Marijuana, IV     Comment: Heroin, crack     Review of Systems   Constitutional: Negative for fever.   HENT: Negative for sore throat.    Respiratory: Positive for cough. Negative for shortness of breath.    Cardiovascular: Negative for chest pain.   Gastrointestinal: Positive for abdominal pain. Negative for blood in stool and nausea.         Positive for discomfort when defecating.   Genitourinary: Negative for dysuria and hematuria.   Musculoskeletal: Negative for back pain.   Skin: Negative for rash.   Neurological: Positive for light-headedness and headaches. Negative for weakness.   Hematological: Does not bruise/bleed easily.       Physical Exam     Initial Vitals [09/22/19 2127]   BP Pulse Resp Temp SpO2   (!) 92/47 76 16 97.6 °F (36.4 °C) 98 %      MAP       --         Physical Exam    Nursing note and vitals reviewed.  Constitutional: She appears well-developed and well-nourished. She is not diaphoretic. No distress.   HENT:   Head: Normocephalic and atraumatic.   Eyes: EOM are normal. Pupils are equal, round, and reactive to light.   Neck: Normal range of motion. Neck supple.   Cardiovascular: Normal rate, regular rhythm, normal heart sounds and intact distal pulses. Exam reveals no gallop and no friction rub.    No murmur heard.  Pulmonary/Chest: Breath sounds normal. No respiratory distress. She has no wheezes. She has no rhonchi. She has no rales.   Abdominal: Soft. Bowel sounds are normal. There is no tenderness. There is no rebound and no guarding.   Musculoskeletal: Normal range of motion.   Neurological: She is alert and oriented to person, place, and time.   Photophobia.   Skin: Skin is warm and dry.   Psychiatric: She has a normal mood and affect. Her behavior is normal. Judgment and thought content normal.         ED Course   Procedures  Labs Reviewed   COMPREHENSIVE METABOLIC PANEL - Abnormal; Notable for the following components:       Result Value    Glucose 179 (*)     BUN, Bld 30 (*)     Creatinine 1.7 (*)     Albumin 2.4 (*)     eGFR if  42 (*)     eGFR if non  36 (*)     All other components within normal limits   CBC W/ AUTO DIFFERENTIAL - Abnormal; Notable for the following components:    WBC 21.21 (*)     RBC 3.74 (*)     Hemoglobin 11.1 (*)     Hematocrit 34.1 (*)     RDW 14.8 (*)     Gran%  88.0 (*)     Lymph% 5.0 (*)     All other components within normal limits   URINALYSIS, REFLEX TO URINE CULTURE - Abnormal; Notable for the following components:    Appearance, UA Cloudy (*)     Protein, UA 3+ (*)     Bilirubin (UA) 1+ (*)     Occult Blood UA 3+ (*)     Urobilinogen, UA 2.0-3.0 (*)     Leukocytes, UA 3+ (*)     All other components within normal limits    Narrative:     Preferred Collection Type->Urine, Clean Catch   URINALYSIS MICROSCOPIC - Abnormal; Notable for the following components:    RBC, UA 5 (*)     WBC, UA 70 (*)     WBC Clumps, UA Moderate (*)     Bacteria Few (*)     All other components within normal limits    Narrative:     Preferred Collection Type->Urine, Clean Catch   CULTURE, URINE   MAGNESIUM   LIPASE   POCT URINE PREGNANCY          Imaging Results    None          Medical Decision Making:   History:   Old Medical Records: I decided to obtain old medical records.  Initial Assessment:   43-year-old female presented with multiple complaints.  Differential Diagnosis:   My differential diagnosis includes migraine headache, tension headache, and viral illness.    Clinical Tests:   Lab Tests: Ordered and Reviewed  ED Management:  The patient was emergently evaluated in the emergency department, her evaluation was significant for middle-age female with a normal neurologic exam as well as a benign abdominal exam.  The patient's labs were significant for an elevated white blood cell, dehydration, infected urine.  The patient likely has dehydration and pyelonephritis.  The patient was aggressively treated with multiple IV fluid boluses, IV Rocephin, IV Compazine, IV Benadryl, and IV Toradol.  The patient reports significant improvement in her symptoms after treatment.  The patient is stable for discharge to home.  She will be discharged home with p.o. diclofenac and p.o. Augmentin.  She is to otherwise follow up with her PCP for further care.            Scribe Attestation:   Scribe #1: I  performed the above scribed service and the documentation accurately describes the services I performed. I attest to the accuracy of the note.        I, Dr. Robert Benson, personally performed the services described in this documentation. All medical record entries made by the scribe were at my direction and in my presence.  I have reviewed the chart and agree that the record reflects my personal performance and is accurate and complete. Robert Benson MD.  1:19 AM 09/23/2019            Clinical Impression:       ICD-10-CM ICD-9-CM   1. Pyelonephritis N12 590.80         Disposition:   Disposition: Discharged  Condition: Stable                        Robert Benson MD  09/23/19 0121

## 2019-09-25 LAB — BACTERIA UR CULT: ABNORMAL

## 2020-03-13 ENCOUNTER — HOSPITAL ENCOUNTER (EMERGENCY)
Facility: HOSPITAL | Age: 45
Discharge: HOME | End: 2020-03-13
Attending: EMERGENCY MEDICINE
Payer: COMMERCIAL

## 2020-03-13 VITALS
OXYGEN SATURATION: 96 % | TEMPERATURE: 98 F | BODY MASS INDEX: 23.91 KG/M2 | HEART RATE: 70 BPM | DIASTOLIC BLOOD PRESSURE: 56 MMHG | WEIGHT: 135 LBS | SYSTOLIC BLOOD PRESSURE: 106 MMHG | RESPIRATION RATE: 16 BRPM

## 2020-03-13 DIAGNOSIS — R51.9 ACUTE NONINTRACTABLE HEADACHE, UNSPECIFIED HEADACHE TYPE: Primary | ICD-10-CM

## 2020-03-13 LAB
B-HCG UR QL: NEGATIVE
CTP QC/QA: YES

## 2020-03-13 PROCEDURE — 96361 HYDRATE IV INFUSION ADD-ON: CPT

## 2020-03-13 PROCEDURE — 63600175 PHARM REV CODE 636 W HCPCS: Performed by: EMERGENCY MEDICINE

## 2020-03-13 PROCEDURE — 81025 URINE PREGNANCY TEST: CPT | Performed by: EMERGENCY MEDICINE

## 2020-03-13 PROCEDURE — 96374 THER/PROPH/DIAG INJ IV PUSH: CPT

## 2020-03-13 PROCEDURE — 96375 TX/PRO/DX INJ NEW DRUG ADDON: CPT

## 2020-03-13 PROCEDURE — 99284 EMERGENCY DEPT VISIT MOD MDM: CPT | Mod: 25

## 2020-03-13 RX ORDER — DIPHENHYDRAMINE HYDROCHLORIDE 50 MG/ML
25 INJECTION INTRAMUSCULAR; INTRAVENOUS
Status: COMPLETED | OUTPATIENT
Start: 2020-03-13 | End: 2020-03-13

## 2020-03-13 RX ORDER — METOCLOPRAMIDE HYDROCHLORIDE 5 MG/ML
10 INJECTION INTRAMUSCULAR; INTRAVENOUS
Status: COMPLETED | OUTPATIENT
Start: 2020-03-13 | End: 2020-03-13

## 2020-03-13 RX ORDER — KETOROLAC TROMETHAMINE 30 MG/ML
10 INJECTION, SOLUTION INTRAMUSCULAR; INTRAVENOUS
Status: COMPLETED | OUTPATIENT
Start: 2020-03-13 | End: 2020-03-13

## 2020-03-13 RX ADMIN — SODIUM CHLORIDE 1000 ML: 0.9 INJECTION, SOLUTION INTRAVENOUS at 07:03

## 2020-03-13 RX ADMIN — DIPHENHYDRAMINE HYDROCHLORIDE 25 MG: 50 INJECTION INTRAMUSCULAR; INTRAVENOUS at 08:03

## 2020-03-13 RX ADMIN — KETOROLAC TROMETHAMINE 10 MG: 30 INJECTION, SOLUTION INTRAMUSCULAR; INTRAVENOUS at 08:03

## 2020-03-13 RX ADMIN — METOCLOPRAMIDE 10 MG: 5 INJECTION, SOLUTION INTRAMUSCULAR; INTRAVENOUS at 08:03

## 2020-03-14 NOTE — DISCHARGE INSTRUCTIONS
As we discussed, return to the emergency department for new or worsening symptoms such as numbness, weakness, fever.

## 2020-03-14 NOTE — ED NOTES
Pt in room 7 for evaluation of headache.  Pt is awake, alert and oriented. Resp even and unlabored. Bimal breath sounds clear.  Pt denies chest pain or sob.  Abd soft and non-tender.  Pt has frontal headache with light sensitivity.

## 2020-03-14 NOTE — ED PROVIDER NOTES
Encounter Date: 3/13/2020    SCRIBE #1 NOTE: ICindi, am scribing for, and in the presence of, Enrike Mancini MD.       History     Chief Complaint   Patient presents with    Headache     took Tylenol at 10am with no relief    Emesis       Time seen by provider: 7:17 PM on 2020    Adeola Cameron is a 44 y.o. female with a PMHx of bipolar disease, heroin abuse with overdose, and opioid dependence who presents the ED with complaints of a throbbing frontal headache since last night. The patient reports the onset of headache as well as nausea last night while painting. The patient tried taking tylenol this morning without relief. She also complains of vomiting, low grade subjective fever, and neck pain. The patient denies blood in her vomit, abdominal pain, rhinorrhea, cough, diarrhea, blood in stool, SOB, LOC, changes in vision, or any other symptoms at this time. She also denies recent injury, sick contact, changes in medication, stroke, alcohol use, drug use, and OTC medication use.    The history is provided by the patient.     Review of patient's allergies indicates:   Allergen Reactions    Sulfa (sulfonamide antibiotics) Anaphylaxis     Past Medical History:   Diagnosis Date    Bipolar affective disorder     Chronic foot pain      Past Surgical History:   Procedure Laterality Date    cervical nerve release       SECTION, CLASSIC      x2    FOOT SURGERY      FRACTURE SURGERY      TUBAL LIGATION       History reviewed. No pertinent family history.  Social History     Tobacco Use    Smoking status: Current Every Day Smoker     Packs/day: 1.00     Types: Cigarettes    Smokeless tobacco: Never Used   Substance Use Topics    Alcohol use: No    Drug use: Yes     Types: Marijuana, IV     Comment: Heroin, crack     Review of Systems   Constitutional: Positive for fever.   HENT: Negative for congestion and rhinorrhea.    Eyes: Negative for visual disturbance.   Respiratory: Negative  for cough and shortness of breath.    Cardiovascular: Negative for chest pain.   Gastrointestinal: Positive for nausea and vomiting. Negative for abdominal pain, blood in stool and diarrhea.   Genitourinary: Negative for difficulty urinating.   Musculoskeletal: Positive for neck pain.   Neurological: Positive for headaches. Negative for syncope.   Hematological: Does not bruise/bleed easily.       Physical Exam     Initial Vitals [03/13/20 1730]   BP Pulse Resp Temp SpO2   118/75 83 16 98.2 °F (36.8 °C) 97 %      MAP       --         Physical Exam    Nursing note and vitals reviewed.  Constitutional: She appears well-developed and well-nourished. She is not diaphoretic. No distress.   HENT:   Head: Normocephalic and atraumatic.   Mouth/Throat: Oropharynx is clear and moist.   Mild frontal head tenderness.   Eyes: Conjunctivae are normal. Pupils are equal, round, and reactive to light.   Mild photophobia. PERRL.   Neck: Neck supple.   Neck is supple. No rigidity noted.   Cardiovascular: Normal rate, regular rhythm, normal heart sounds and intact distal pulses. Exam reveals no gallop and no friction rub.    No murmur heard.  Pulmonary/Chest: Breath sounds normal. She has no wheezes. She has no rhonchi. She has no rales.   Abdominal: Soft. She exhibits no distension. There is no tenderness.   Musculoskeletal: Normal range of motion.   Neurological: She is alert and oriented to person, place, and time.   No focal neurological deficits noted.  Cranial nerves III-XII grossly intact. 5/5 strength and sensation to light touch intact.   Skin: No rash noted. No erythema.   Psychiatric: Her speech is normal.         ED Course   Procedures  Labs Reviewed   POCT URINE PREGNANCY          Imaging Results    None          Medical Decision Making:   History:   Old Medical Records: I decided to obtain old medical records.  Clinical Tests:   Lab Tests: Reviewed and Ordered            Scribe Attestation:   Scribe #1: I performed the  above scribed service and the documentation accurately describes the services I performed. I attest to the accuracy of the note.    I, Dr. Enrike Mancini, personally performed the services described in this documentation. All medical record entries made by the scribe were at my direction and in my presence.  I have reviewed the chart and agree that the record reflects my personal performance and is accurate and complete. Enrike Mancini MD.  10:40 PM 03/13/2020    Adeola Cameron is a 44 y.o. female presenting with headache.  Patient and significant other correlate this type of headache with stress in the past and the patient does note she is under stress lately.  She has significant relief with medications here.  I have very low suspicion for alternative causes of headache such as intracranial hemorrhage, ischemic process, meningitis, idiopathic intracranial hypertension, or cavernous venous sinus thrombosis.  The patient has a non-focal neurological examination with history not consistent with emergent causes of headache warranting present therapeutic intervention.  I do not think further brain imaging or lumbar puncture are indicated at this time.  Close outpatient follow-up for reassessment recommended with detailed return precautions reviewed.        ED Course as of Mar 13 2240   Fri Mar 13, 2020   2112 Patient resting comfortably with relief of headache and no complaints.  She wishes to go home.    [MR]      ED Course User Index  [MR] Enrike Mancini MD                Clinical Impression:       ICD-10-CM ICD-9-CM   1. Acute nonintractable headache, unspecified headache type R51 784.0         Disposition:   Disposition: Discharged  Condition: Stable     ED Disposition Condition    Discharge Stable        ED Prescriptions     None        Follow-up Information     Follow up With Specialties Details Why Contact Info    Wayne Osborn MD Internal Medicine  Next week [address] [place] [address]  [place] [address]  [phone]                                       Enrike Mancini MD  03/13/20 0040

## 2021-10-20 ENCOUNTER — HOSPITAL ENCOUNTER (EMERGENCY)
Facility: HOSPITAL | Age: 46
Discharge: LEFT BEFORE DISCHARGE | End: 2021-10-20
Attending: EMERGENCY MEDICINE
Payer: MEDICAID

## 2021-10-20 VITALS
WEIGHT: 135 LBS | SYSTOLIC BLOOD PRESSURE: 150 MMHG | RESPIRATION RATE: 16 BRPM | BODY MASS INDEX: 23.91 KG/M2 | OXYGEN SATURATION: 100 % | HEART RATE: 90 BPM | DIASTOLIC BLOOD PRESSURE: 83 MMHG | TEMPERATURE: 98 F

## 2021-10-20 DIAGNOSIS — T40.604A OPIATE OVERDOSE, UNDETERMINED INTENT, INITIAL ENCOUNTER: Primary | ICD-10-CM

## 2021-10-20 DIAGNOSIS — T40.601A OPIATE OVERDOSE: ICD-10-CM

## 2021-10-20 PROCEDURE — 99283 EMERGENCY DEPT VISIT LOW MDM: CPT

## 2022-10-14 ENCOUNTER — HOSPITAL ENCOUNTER (EMERGENCY)
Facility: HOSPITAL | Age: 47
Discharge: HOME | End: 2022-10-14
Attending: EMERGENCY MEDICINE
Payer: COMMERCIAL

## 2022-10-14 VITALS
TEMPERATURE: 99 F | BODY MASS INDEX: 19.14 KG/M2 | SYSTOLIC BLOOD PRESSURE: 111 MMHG | HEIGHT: 63 IN | OXYGEN SATURATION: 99 % | DIASTOLIC BLOOD PRESSURE: 68 MMHG | HEART RATE: 72 BPM | WEIGHT: 108 LBS | RESPIRATION RATE: 17 BRPM

## 2022-10-14 DIAGNOSIS — F14.10 COCAINE ABUSE: Primary | ICD-10-CM

## 2022-10-14 LAB
ALBUMIN SERPL BCP-MCNC: 3.3 G/DL (ref 3.5–5.2)
ALP SERPL-CCNC: 48 U/L (ref 55–135)
ALT SERPL W/O P-5'-P-CCNC: 25 U/L (ref 10–44)
AMPHET+METHAMPHET UR QL: NEGATIVE
ANION GAP SERPL CALC-SCNC: 8 MMOL/L (ref 8–16)
APAP SERPL-MCNC: <3 UG/ML (ref 10–20)
AST SERPL-CCNC: 30 U/L (ref 10–40)
B-HCG UR QL: NEGATIVE
BACTERIA #/AREA URNS AUTO: ABNORMAL /HPF
BARBITURATES UR QL SCN>200 NG/ML: NEGATIVE
BASOPHILS # BLD AUTO: 0.04 K/UL (ref 0–0.2)
BASOPHILS NFR BLD: 0.6 % (ref 0–1.9)
BENZODIAZ UR QL SCN>200 NG/ML: ABNORMAL
BILIRUB SERPL-MCNC: 0.2 MG/DL (ref 0.1–1)
BILIRUB UR QL STRIP: NEGATIVE
BUN SERPL-MCNC: 17 MG/DL (ref 6–20)
BZE UR QL SCN: ABNORMAL
CALCIUM SERPL-MCNC: 9 MG/DL (ref 8.7–10.5)
CANNABINOIDS UR QL SCN: NEGATIVE
CHLORIDE SERPL-SCNC: 106 MMOL/L (ref 95–110)
CLARITY UR REFRACT.AUTO: ABNORMAL
CO2 SERPL-SCNC: 26 MMOL/L (ref 23–29)
COLOR UR AUTO: YELLOW
CREAT SERPL-MCNC: 0.7 MG/DL (ref 0.5–1.4)
CREAT UR-MCNC: 127 MG/DL (ref 15–325)
CTP QC/QA: YES
DIFFERENTIAL METHOD: ABNORMAL
EOSINOPHIL # BLD AUTO: 0.1 K/UL (ref 0–0.5)
EOSINOPHIL NFR BLD: 1 % (ref 0–8)
ERYTHROCYTE [DISTWIDTH] IN BLOOD BY AUTOMATED COUNT: 13.3 % (ref 11.5–14.5)
EST. GFR  (NO RACE VARIABLE): >60 ML/MIN/1.73 M^2
ETHANOL SERPL-MCNC: <10 MG/DL
GLUCOSE SERPL-MCNC: 89 MG/DL (ref 70–110)
GLUCOSE UR QL STRIP: NEGATIVE
HCT VFR BLD AUTO: 40.2 % (ref 37–48.5)
HGB BLD-MCNC: 13.1 G/DL (ref 12–16)
HGB UR QL STRIP: NEGATIVE
IMM GRANULOCYTES # BLD AUTO: 0.01 K/UL (ref 0–0.04)
IMM GRANULOCYTES NFR BLD AUTO: 0.2 % (ref 0–0.5)
KETONES UR QL STRIP: ABNORMAL
LEUKOCYTE ESTERASE UR QL STRIP: ABNORMAL
LYMPHOCYTES # BLD AUTO: 3.6 K/UL (ref 1–4.8)
LYMPHOCYTES NFR BLD: 57.5 % (ref 18–48)
MCH RBC QN AUTO: 30.6 PG (ref 27–31)
MCHC RBC AUTO-ENTMCNC: 32.6 G/DL (ref 32–36)
MCV RBC AUTO: 94 FL (ref 82–98)
METHADONE UR QL SCN>300 NG/ML: NEGATIVE
MICROSCOPIC COMMENT: ABNORMAL
MONOCYTES # BLD AUTO: 0.4 K/UL (ref 0.3–1)
MONOCYTES NFR BLD: 5.6 % (ref 4–15)
NEUTROPHILS # BLD AUTO: 2.2 K/UL (ref 1.8–7.7)
NEUTROPHILS NFR BLD: 35.1 % (ref 38–73)
NITRITE UR QL STRIP: NEGATIVE
NRBC BLD-RTO: 0 /100 WBC
OPIATES UR QL SCN: NEGATIVE
PCP UR QL SCN>25 NG/ML: NEGATIVE
PH UR STRIP: 7 [PH] (ref 5–8)
PLATELET # BLD AUTO: 203 K/UL (ref 150–450)
PMV BLD AUTO: 9.8 FL (ref 9.2–12.9)
POTASSIUM SERPL-SCNC: 4.6 MMOL/L (ref 3.5–5.1)
PROT SERPL-MCNC: 6.3 G/DL (ref 6–8.4)
PROT UR QL STRIP: ABNORMAL
RBC # BLD AUTO: 4.28 M/UL (ref 4–5.4)
RBC #/AREA URNS AUTO: 2 /HPF (ref 0–4)
SARS-COV-2 RDRP RESP QL NAA+PROBE: NEGATIVE
SODIUM SERPL-SCNC: 140 MMOL/L (ref 136–145)
SP GR UR STRIP: 1.02 (ref 1–1.03)
SQUAMOUS #/AREA URNS AUTO: 18 /HPF
TOXICOLOGY INFORMATION: ABNORMAL
TSH SERPL DL<=0.005 MIU/L-ACNC: 0.54 UIU/ML (ref 0.4–4)
URN SPEC COLLECT METH UR: ABNORMAL
WBC # BLD AUTO: 6.26 K/UL (ref 3.9–12.7)
WBC #/AREA URNS AUTO: 5 /HPF (ref 0–5)

## 2022-10-14 PROCEDURE — 80307 DRUG TEST PRSMV CHEM ANLYZR: CPT | Performed by: EMERGENCY MEDICINE

## 2022-10-14 PROCEDURE — 84443 ASSAY THYROID STIM HORMONE: CPT | Performed by: EMERGENCY MEDICINE

## 2022-10-14 PROCEDURE — U0002 COVID-19 LAB TEST NON-CDC: HCPCS | Performed by: EMERGENCY MEDICINE

## 2022-10-14 PROCEDURE — 80053 COMPREHEN METABOLIC PANEL: CPT | Performed by: EMERGENCY MEDICINE

## 2022-10-14 PROCEDURE — 99283 EMERGENCY DEPT VISIT LOW MDM: CPT

## 2022-10-14 PROCEDURE — 82077 ASSAY SPEC XCP UR&BREATH IA: CPT | Performed by: EMERGENCY MEDICINE

## 2022-10-14 PROCEDURE — 85025 COMPLETE CBC W/AUTO DIFF WBC: CPT | Performed by: EMERGENCY MEDICINE

## 2022-10-14 PROCEDURE — 99284 EMERGENCY DEPT VISIT MOD MDM: CPT | Mod: CS,,, | Performed by: EMERGENCY MEDICINE

## 2022-10-14 PROCEDURE — 81001 URINALYSIS AUTO W/SCOPE: CPT | Performed by: EMERGENCY MEDICINE

## 2022-10-14 PROCEDURE — 80143 DRUG ASSAY ACETAMINOPHEN: CPT | Performed by: EMERGENCY MEDICINE

## 2022-10-14 PROCEDURE — 99284 PR EMERGENCY DEPT VISIT,LEVEL IV: ICD-10-PCS | Mod: CS,,, | Performed by: EMERGENCY MEDICINE

## 2022-10-14 PROCEDURE — 81025 URINE PREGNANCY TEST: CPT | Performed by: EMERGENCY MEDICINE

## 2022-10-14 RX ORDER — IBUPROFEN 200 MG
1 TABLET ORAL DAILY
Status: DISCONTINUED | OUTPATIENT
Start: 2022-10-15 | End: 2022-10-15 | Stop reason: HOSPADM

## 2022-10-15 NOTE — ED PROVIDER NOTES
"Encounter Date: 10/14/2022       History     Chief Complaint   Patient presents with    Medical Clearance     Presented to [facility] for Detox but on arrival took 2 Somas per [facility] need medical clearance     HPI  47 Y/O F presents voluntarily requesting labs in order to return to [facility], where she came from.  She reports extensive history of crack cocaine smoking in, which have led to 48-72 hours of being awake.  Prior to ED arrival she took 2 benzos tablets and 2 Soma "has a figure I need to get some sleep".  She denies SI or HI.  No auditory visual hallucinations.  Or injuries or falls.    Review of patient's allergies indicates:   Allergen Reactions    Sulfa (sulfonamide antibiotics) Anaphylaxis     Past Medical History:   Diagnosis Date    Bipolar affective disorder     Chronic foot pain      Past Surgical History:   Procedure Laterality Date    cervical nerve release       SECTION, CLASSIC      x2    FOOT SURGERY      FRACTURE SURGERY      TUBAL LIGATION       No family history on file.  Social History     Tobacco Use    Smoking status: Every Day     Packs/day: 1.00     Types: Cigarettes    Smokeless tobacco: Never   Substance Use Topics    Alcohol use: No    Drug use: Yes     Types: Marijuana, IV     Comment: Heroin, crack     Review of Systems  CONST: No fever, chills, weight change, or fatigue.  HEENT: No headache, blurry vision/change in vision, sore throat, ear pain, eye pain, otorrhea, rhinorrhea, tooth pain, swelling, or voice changes.  NECK: No pain, masses, trauma, or redness.  HEART: No pain, palpitations, or diaphoresis.  LUNG: No SOB, cough, orthopnea, STERLING or other complaints.  ABDOMEN: No pain, nausea, vomiting, diarrhea, constipation, or flank pain.  : No discharge, dysuria, lesions, rashes, masses, sores.  EXTREMITIES: FROM with No swelling, redness, injuries/trauma, lesions, sores, weakness, numbness, or tingling.  NEURO: No dizziness, weakness, fatigue, tremors, headache, " change in vision or disturbances of balance or coordination.  SKIN: No lesions, rashes, trauma or other complaints.    Physical Exam     Initial Vitals [10/14/22 1807]   BP Pulse Resp Temp SpO2   104/67 80 18 98.2 °F (36.8 °C) 96 %      MAP       --         Physical Exam  GENERAL: Calm; Cooperative; Well-appearing and Non-Toxic; Well-Nourished; NAD.  HEENT: AT/NC; speaking full sentences with no slurring of speech or drooling/inability to tolerate oral secretions.  NECK: Supple, FROM with no meningismus, no accessory muscle use. No Carotid Bruits B/L.  THORAX: Atraumatic   HEART: Regular rate and rhythm, no M/G/T.  LUNGS: No Tachypnea, No Retractions, and CTA B/L with no W/R/R.  ABDOMEN: Soft, ND, NTTP.   EXTREMITIES: FROM.  No asymmetry to bilateral lower extremities are appreciated pedal edema.  SKIN: Warm, Dry, No Skin Tears or Rashes.  VASCULAR: 2+ pulses Prox/Dist & Symmetrical with No delay.  NEUROLOGIC: AAOx3; answering questions appropriately without focal deficit  PSYCH: No SI, HI.    ED Course   Procedures  Labs Reviewed   CBC W/ AUTO DIFFERENTIAL - Abnormal; Notable for the following components:       Result Value    Gran % 35.1 (*)     Lymph % 57.5 (*)     All other components within normal limits   COMPREHENSIVE METABOLIC PANEL - Abnormal; Notable for the following components:    Albumin 3.3 (*)     Alkaline Phosphatase 48 (*)     All other components within normal limits   URINALYSIS, REFLEX TO URINE CULTURE - Abnormal; Notable for the following components:    Appearance, UA Hazy (*)     Protein, UA Trace (*)     Ketones, UA Trace (*)     Leukocytes, UA 1+ (*)     All other components within normal limits    Narrative:     Specimen Source->Urine   DRUG SCREEN PANEL, URINE EMERGENCY - Abnormal; Notable for the following components:    Benzodiazepines Presumptive Positive (*)     Cocaine (Metab.) Presumptive Positive (*)     All other components within normal limits    Narrative:     Specimen  Source->Urine   ACETAMINOPHEN LEVEL - Abnormal; Notable for the following components:    Acetaminophen (Tylenol), Serum <3.0 (*)     All other components within normal limits   URINALYSIS MICROSCOPIC - Abnormal; Notable for the following components:    Bacteria Many (*)     All other components within normal limits    Narrative:     Specimen Source->Urine   TSH   ALCOHOL,MEDICAL (ETHANOL)   SARS-COV-2 RNA AMPLIFICATION, QUAL   POCT URINE PREGNANCY          Imaging Results    None          Medications   nicotine 14 mg/24 hr 1 patch (has no administration in time range)     Medical Decision Making:   Initial Assessment:   Well appearing, afebrile, atraumatic and hemodynamically stable somnolent, but arousable to verbal stimuli female with no focal neurological deficits  ____________________  Orlando Blankenship MD, [facility]  Emergency Medicine Staff  7:57 PM 10/14/2022    F/U:  Patient tolerating food and no acute distress resting comfortably.  She denies any dysuria, increased urinary frequency, urinary hesitancy or urgency with a soft nontender suprapubic pain. Will await/F/UCx.  ____________________  Orlando Blankenship MD, [facility]  Emergency Medicine Staff  10:45 PM 10/14/2022    Clinical Tests:   Lab Tests: Ordered and Reviewed  Radiological Study: Ordered and Reviewed  Medical Tests: Ordered and Reviewed                        Clinical Impression:   Final diagnoses:  [F14.10] Cocaine abuse (Primary)      ED Disposition Condition    Discharge Stable          ED Prescriptions    None       Follow-up Information    None          Esteban Blankenship MD  10/14/22 9920